# Patient Record
Sex: MALE | Race: WHITE | NOT HISPANIC OR LATINO
[De-identification: names, ages, dates, MRNs, and addresses within clinical notes are randomized per-mention and may not be internally consistent; named-entity substitution may affect disease eponyms.]

---
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
## 2018-03-07 ENCOUNTER — APPOINTMENT (OUTPATIENT)
Dept: PLASTIC SURGERY | Facility: CLINIC | Age: 18
End: 2018-03-07

## 2019-04-15 ENCOUNTER — EMERGENCY (EMERGENCY)
Facility: HOSPITAL | Age: 19
LOS: 0 days | Discharge: HOME | End: 2019-04-15
Attending: EMERGENCY MEDICINE | Admitting: EMERGENCY MEDICINE
Payer: MEDICAID

## 2019-04-15 VITALS
SYSTOLIC BLOOD PRESSURE: 141 MMHG | TEMPERATURE: 100 F | HEART RATE: 96 BPM | DIASTOLIC BLOOD PRESSURE: 68 MMHG | RESPIRATION RATE: 18 BRPM | OXYGEN SATURATION: 99 %

## 2019-04-15 DIAGNOSIS — J02.9 ACUTE PHARYNGITIS, UNSPECIFIED: ICD-10-CM

## 2019-04-15 DIAGNOSIS — Z79.2 LONG TERM (CURRENT) USE OF ANTIBIOTICS: ICD-10-CM

## 2019-04-15 PROCEDURE — 99283 EMERGENCY DEPT VISIT LOW MDM: CPT

## 2019-04-15 RX ORDER — IBUPROFEN 200 MG
800 TABLET ORAL ONCE
Qty: 0 | Refills: 0 | Status: COMPLETED | OUTPATIENT
Start: 2019-04-15 | End: 2019-04-15

## 2019-04-15 RX ORDER — DEXAMETHASONE 0.5 MG/5ML
10 ELIXIR ORAL ONCE
Qty: 0 | Refills: 0 | Status: COMPLETED | OUTPATIENT
Start: 2019-04-15 | End: 2019-04-15

## 2019-04-15 RX ADMIN — Medication 800 MILLIGRAM(S): at 12:23

## 2019-04-15 RX ADMIN — Medication 1 TABLET(S): at 12:23

## 2019-04-15 RX ADMIN — Medication 10 MILLIGRAM(S): at 12:33

## 2019-04-15 NOTE — ED PROVIDER NOTE - PHYSICAL EXAMINATION
CONSTITUTIONAL: Well-developed; well-nourished; in no acute distress. Tolerating secretions.   SKIN: warm, dry  HEAD: Normocephalic; atraumatic.  EYES: no conjunctival injection. PERRL.   ENT: No nasal discharge; airway clear. + b/l tonsillar erythema, edema with exudate.   NECK: Supple; non tender.  CARD: S1, S2 normal; no murmurs, gallops, or rubs. Regular rate and rhythm.   RESP: No wheezes, rales or rhonchi.  ABD: soft ntnd  EXT: Normal ROM.  No clubbing, cyanosis or edema.   LYMPH: No acute cervical adenopathy.  NEURO: Alert, oriented, grossly unremarkable  PSYCH: Cooperative, appropriate.

## 2019-04-15 NOTE — ED PROVIDER NOTE - OBJECTIVE STATEMENT
18 y/o male with no PMH who presents to ED for 2 days of tactile fever, chills, sore throat. No cough, ill contacts. No history of STD or oral sex. No difficulty swallowing or opening the mouth.

## 2019-04-15 NOTE — ED ADULT NURSE NOTE - OBJECTIVE STATEMENT
pt presents with sore throat and mid back pain x 2 days. Infected epiglottis noted, pt reports chills at home, stating didn't check temp at home.

## 2019-04-15 NOTE — ED PROVIDER NOTE - CLINICAL SUMMARY MEDICAL DECISION MAKING FREE TEXT BOX
18 yo male with exudative pharyngitis,  no drooling or trismus , no clinical signs of PTA.  Decadron given , abx , follow up with PMD< strict return precautions given.,

## 2019-04-15 NOTE — ED PROVIDER NOTE - NSFOLLOWUPCLINICS_GEN_ALL_ED_FT
[facility]  ENT  [address], [address]  [place] [address]  Phone: [phone]  Fax:   Follow Up Time:

## 2019-04-15 NOTE — ED PROVIDER NOTE - ATTENDING CONTRIBUTION TO CARE
20 yo male with exudative pharyngitis, no PTA , supple neck, no trismus or drooling, nml phonation and work of breathing.  Plan: steroids, abx, follow up with PMD , return to ER if any worsening symptoms,  patient and mom are amenable with the plan.

## 2020-08-03 ENCOUNTER — TRANSCRIPTION ENCOUNTER (OUTPATIENT)
Age: 20
End: 2020-08-03

## 2021-02-23 ENCOUNTER — TRANSCRIPTION ENCOUNTER (OUTPATIENT)
Age: 21
End: 2021-02-23

## 2023-10-11 NOTE — ED ADULT NURSE NOTE - IN THE PAST YEAR, HOW OFTEN HAVE YOU USED TOBACCO PRODUCTS?
CC:  Pollo Mays is here today for Office Visit, Other (Follow up depression as he stopped taking his medication), and Wrist Pain (Right wrist pain started 10/10/2023)   . Medications: medications verified and updated  Refills needed today? YES  Patient would like communication of their results via:   Karly Daughters  Advanced directives: No          Health Maintenance Due   Topic Date Due   â¢ Hepatitis B Vaccine (1 of 3 - 3-dose series) Never done   â¢ DTaP/Tdap/Td Vaccine (1 - Tdap) Never done   â¢ Depression Screening  10/14/2023     Patient is due for topics listed above, he wishes to proceed with Depression Screening , but is not proceeding with the rest at this time. The following has occurred: to speak to provider. Review Flowsheet  More data exists       10/11/2023   PHQ 2/9 Score   Adult PHQ 2 Score 4   Adult PHQ 2 Interpretation Further screening needed   Little interest or pleasure in activity? Nearly every day   Feeling down, depressed or hopeless? Several days   Adult PHQ 9 Score 21   Adult PHQ 9 Interpretation Severe Depression   Trouble falling or staying asleep or sleeping all the time? Nearly every day   Feeling tired or having little energy? Nearly every day   Poor appetite or overeating? More than half the days   Feeling bad about yourself or that you are a failure or have let yourself or family down? Nearly every day   Trouble concentrating on things such as reading the newspaper or watching TV? More than half the days   Moving or speaking slowly that other people have noticed or the opposite - being so fidgety or restless that you have been moving around a lot more than usual? More than half the days   Thoughts that you would be better off dead or of hurting yourself in some way? More than half the days   If you reported any problems, how difficult have these problems made it to do your work, take care of things at home, or get along with other people?  Very difficult       DEPRESSION ASSESSMENT/PLAN:  Depression Screening performed. Screening time with patient was 3 minutes. Never

## 2025-05-02 ENCOUNTER — EMERGENCY (EMERGENCY)
Facility: HOSPITAL | Age: 25
LOS: 0 days | Discharge: HOME | End: 2025-05-02
Attending: EMERGENCY MEDICINE
Payer: SELF-PAY

## 2025-05-02 VITALS
DIASTOLIC BLOOD PRESSURE: 69 MMHG | TEMPERATURE: 99 F | OXYGEN SATURATION: 99 % | WEIGHT: 175.05 LBS | HEART RATE: 75 BPM | SYSTOLIC BLOOD PRESSURE: 120 MMHG | RESPIRATION RATE: 20 BRPM
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
## 2025-05-02 DIAGNOSIS — S10.86XA INSECT BITE OF OTHER SPECIFIED PART OF NECK, INITIAL ENCOUNTER: ICD-10-CM

## 2025-05-02 PROBLEM — Z78.9 OTHER SPECIFIED HEALTH STATUS: Chronic | Status: ACTIVE | Noted: 2019-04-15

## 2025-05-02 PROCEDURE — 99282 EMERGENCY DEPT VISIT SF MDM: CPT

## 2025-05-02 PROCEDURE — 99053 MED SERV 10PM-8AM 24 HR FAC: CPT

## 2025-05-02 PROCEDURE — 99283 EMERGENCY DEPT VISIT LOW MDM: CPT

## 2025-05-02 NOTE — ED PROVIDER NOTE - OBJECTIVE STATEMENT
Patient is a 25-year-old male presents for evaluation of potential sting patient was stung earlier in the day on his neck presents here for evaluation states that he considers a wasp or yellowjacket sting otherwise asymptomatic pain is improved no fevers no chills no reaction

## 2025-05-02 NOTE — ED PROVIDER NOTE - NSFOLLOWUPINSTRUCTIONS_ED_ALL_ED_FT
Please follow up with your primary care doctor.    Bee, Wasp, or Hornet Sting, Adult  Bees, wasps, and hornets are part of a family of insects that sting. Normally, a sting will cause pain, redness, and swelling at the sting site. However, some people have an allergy to these stings, and their reactions can be much more serious.    What increases the risk?  You may be at a greater risk of getting stung if you:  Provoke a stinging insect by swatting or disturbing it.  Wear strong-smelling soaps, deodorants, or body sprays.  Spend time outdoors near gardens with flowers or fruit trees or in clothes that expose skin.  Eat or drink outside.  What are the signs or symptoms?  The reaction to an insect sting can vary from a mild, normal response to life-threatening anaphylaxis. The sting site is often a red lump in the skin, sometimes with a tiny hole in the center, that may still have the stinger in the center of the wound.    Normal reaction    A normal reaction is experienced by most people after an insect sting. Symptoms include:  Pain, redness, and swelling at the sting site. These can develop over 24–48 hours.  Pain, redness, and swelling that may spread to a larger, connected area beyond the sting site. The spreading can continue over 24–48 hours.  Allergic reaction    An allergic reaction can vary in severity and includes symptoms in other areas of the body beyond the sting site. People who experience an allergic reaction have a higher risk of having similar or worse symptoms the next time they are stung. Symptoms may include:  Hives, itching, and swelling.  Abdominal symptoms including cramping, nausea, vomiting, and diarrhea.  Severe symptoms that require immediate medical attention include:  Chest pain or tightness.  Wheezing or trouble breathing.  Swelling of the tongue, throat, or lips.  Trouble swallowing or hoarse voice.  Anaphylactic reaction    An anaphylactic reaction is a severe, life-threatening allergy and requires immediate medical attention. The symptoms often include severe allergic reaction symptoms that develop rapidly and lead to:  A sudden and sharp drop in blood pressure.  Dizziness.  Loss of consciousness.  How is this diagnosed?  This condition is usually diagnosed based on your symptoms and medical history as well as a physical exam. You may have an allergy test to determine if you are allergic to the insect venom.    How is this treated?  If you were stung by a bee, the stinger and a small sac of venom may be in the wound. Remove the stinger as soon as possible. Do this by brushing across the wound with gauze, a clean fingernail, or a flat card such as a credit card. This can help reduce the severity of your body's reaction to the sting.    Normal reactions can be treated with:  Washing the area thoroughly with soap and water.  Applying ice to the area to reduce swelling.  Oral or topical medicines to help reduce pain and itching, if present.  Pay close attention to your symptoms after you have been stung. If possible, have someone stay with you to see if an allergic reaction develops. If allergic symptoms develop, oral antihistamines can be taken and you will need medical help right away.    If you had an allergic reaction before, you may need to:  Use an auto-injector "pen"(pre-filled automatic epinephrine injection device)at the first sign of an allergic reaction.  Get medical help right away because the epinephrine is short-acting. It is intended to give you more time to get to an emergency room.  Follow these instructions at home:  Bag of ice on a towel on the skin.   Wash the sting site 2–3 times a day with soap and water as told by your health care provider.  Apply or take over-the-counter and prescription medicines only as told by your health care provider.  If directed, apply ice to the sting area.  Put ice in a plastic bag.  Place a towel between your skin and the bag.  Leave the ice on for 20 minutes, 2–3 times a day.  Do not scratch the sting area.  If you had a severe allergic reaction to a sting, you may need to:  Wear a medical bracelet or necklace that lists the allergy.  Carry an anaphylaxis kit or an epinephrine auto-injector "pen" with you at all times. Tell your family members, friends, and coworkers when and how to use it. Use it at the first sign of an allergic reaction.  How is this prevented?  Avoid swatting at stinging insects and disturbing insect nests.  Do not use fragrant soaps or lotions and avoid sitting near flowering plants, if possible.  Wear shoes, pants, and long sleeves when spending time outdoors, especially in grassy areas where stinging insects are common.  Keep outdoor areas free from nests or hives.  Keep food and drink containers covered when eating outdoors.  Wear gloves if you are gardening or working outdoors.  Find a barrier between you and the insect(s), such as a door, if an attack by a stinging insect or a swarm seems likely.  Contact a health care provider if:  Your symptoms do not get better in 2–3 days.  You have redness, swelling, or pain that spreads beyond the area of the sting.  You have a fever.  Get help right away if:  You have symptoms of a severe allergic reaction. These include:  Chest tightness or pain.  Wheezing, or trouble swallowing or breathing.  Light-headedness, dizziness, or fainting.  Itchy, raised, red patches on the skin beyond the sting site.  Abdominal cramping, nausea, vomiting, or diarrhea.  Trouble swallowing or a swollen tongue, throat, or lips.  These symptoms may be an emergency. Get help right away. Call 911.  Do not wait to see if the symptoms will go away.  Do not drive yourself to the hospital.  Summary  Stings from bees, wasps, and hornets can cause pain and swelling, but they are usually not serious. However, some people may have an allergic reaction to a sting. This can cause the symptoms to be more severe.  Pay close attention to your symptoms after you have been stung. If possible, have someone stay with you to look for signs of worsening symptoms.  Call your health care provider if you have any signs of an allergic reaction.  This information is not intended to replace advice given to you by your health care provider. Make sure you discuss any questions you have with your health care provider.

## 2025-05-02 NOTE — ED PROVIDER NOTE - PHYSICAL EXAMINATION
On physical exam patient does have a small sting frank located mid trapezius no signs of erythema no signs of cellulitis no signs of abscess no signs of allergic reaction stinger appears completely removed I will discharge follow-up to PMD next 24 to 48 hours or return to the emergency department for any further concerns no signs of infection at this time

## 2025-05-02 NOTE — ED PROVIDER NOTE - CCCP TRG CHIEF CMPLNT
In an effort to ensure that our patients [employer], a Team Member has reviewed your chart and identified an opportunity to provide the best care possible. An attempt was made to discuss or schedule overdue Preventive or Disease Management screening.     The Outcome was Contact was not made, letter/portal message sent.  If you have any questions or need help with scheduling, contact your primary care provider.. Care Gaps include Colorectal Cancer Screening.    stings, insect

## 2025-05-02 NOTE — ED PROVIDER NOTE - ATTENDING CONTRIBUTION TO CARE
I have personally performed a history and physical exam on this patient and personally directed the management of the patient. Patient is a 25-year-old male presents for evaluation of potential sting patient was stung earlier in the day on his neck presents here for evaluation states that he considers a wasp or yellowjacket sting otherwise asymptomatic pain is improved no fevers no chills no reaction    On physical exam patient does have a small sting frank located mid trapezius no signs of erythema no signs of cellulitis no signs of abscess no signs of allergic reaction stinger appears completely removed I will discharge follow-up to PMD next 24 to 48 hours or return to the emergency department for any further concerns no signs of infection at this time

## 2025-05-02 NOTE — ED PROVIDER NOTE - CLINICAL SUMMARY MEDICAL DECISION MAKING FREE TEXT BOX
Patient is a 25-year-old male presents for evaluation of potential sting patient was stung earlier in the day on his neck presents here for evaluation states that he considers a wasp or yellowjacket sting otherwise asymptomatic pain is improved no fevers no chills no reaction    On physical exam patient does have a small sting frank located mid trapezius no signs of erythema no signs of cellulitis no signs of abscess no signs of allergic reaction stinger appears completely removed I will discharge follow-up to PMD next 24 to 48 hours or return to the emergency department for any further concerns no signs of infection at this time

## 2025-05-02 NOTE — ED PROVIDER NOTE - PATIENT PORTAL LINK FT
You can access the FollowMyHealth Patient Portal offered by Health system by registering at the following website: [email or web address]. By joining [employer]’s FollowMyHealth portal, you will also be able to view your health information using other applications (apps) compatible with our system.

## 2025-06-18 ENCOUNTER — RESULT REVIEW (OUTPATIENT)
Age: 25
End: 2025-06-18

## 2025-06-18 ENCOUNTER — APPOINTMENT (OUTPATIENT)
Dept: PULMONOLOGY | Facility: CLINIC | Age: 25
End: 2025-06-18
Payer: MEDICAID

## 2025-06-18 ENCOUNTER — LABORATORY RESULT (OUTPATIENT)
Age: 25
End: 2025-06-18

## 2025-06-18 ENCOUNTER — OUTPATIENT (OUTPATIENT)
Dept: OUTPATIENT SERVICES | Facility: HOSPITAL | Age: 25
LOS: 1 days | End: 2025-06-18
Payer: MEDICAID

## 2025-06-18 VITALS
HEART RATE: 83 BPM | OXYGEN SATURATION: 97 % | WEIGHT: 193 LBS | SYSTOLIC BLOOD PRESSURE: 130 MMHG | DIASTOLIC BLOOD PRESSURE: 62 MMHG

## 2025-06-18 DIAGNOSIS — R06.02 SHORTNESS OF BREATH: ICD-10-CM

## 2025-06-18 PROBLEM — F17.290 NICOTINE DEPENDENCE DUE TO VAPING TOBACCO PRODUCT: Status: ACTIVE | Noted: 2025-06-18

## 2025-06-18 PROCEDURE — 71046 X-RAY EXAM CHEST 2 VIEWS: CPT | Mod: 26

## 2025-06-18 PROCEDURE — 71046 X-RAY EXAM CHEST 2 VIEWS: CPT

## 2025-06-18 PROCEDURE — G2211 COMPLEX E/M VISIT ADD ON: CPT | Mod: NC

## 2025-06-18 PROCEDURE — 99204 OFFICE O/P NEW MOD 45 MIN: CPT

## 2025-06-18 RX ORDER — ALBUTEROL SULFATE 90 UG/1
108 (90 BASE) INHALANT RESPIRATORY (INHALATION)
Qty: 1 | Refills: 1 | Status: ACTIVE | COMMUNITY
Start: 2025-06-18 | End: 1900-01-01

## 2025-06-18 RX ORDER — BUPRENORPHINE HCL/NALOXONE HCL 8 MG-2 MG
TABLET, SUBLINGUAL SUBLINGUAL
Refills: 0 | Status: ACTIVE | COMMUNITY

## 2025-06-19 DIAGNOSIS — R06.02 SHORTNESS OF BREATH: ICD-10-CM

## 2025-06-20 LAB
A ALTERNATA IGE QN: <0.1 KUA/L
A FUMIGATUS IGE QN: <0.1 KUA/L
BASOPHILS # BLD AUTO: 0.03 K/UL
BASOPHILS NFR BLD AUTO: 0.8 %
C ALBICANS IGE QN: <0.1 KUA/L
C HERBARUM IGE QN: <0.1 KUA/L
CAT DANDER IGE QN: <0.1 KUA/L
COMMON RAGWEED IGE QN: <0.1 KUA/L
D FARINAE IGE QN: 0.21 KUA/L
D PTERONYSS IGE QN: 0.27 KUA/L
DEPRECATED A ALTERNATA IGE RAST QL: 0
DEPRECATED A FUMIGATUS IGE RAST QL: 0
DEPRECATED C ALBICANS IGE RAST QL: 0
DEPRECATED C HERBARUM IGE RAST QL: 0
DEPRECATED CAT DANDER IGE RAST QL: 0
DEPRECATED COMMON RAGWEED IGE RAST QL: 0
DEPRECATED D FARINAE IGE RAST QL: NORMAL
DEPRECATED D PTERONYSS IGE RAST QL: NORMAL
DEPRECATED DOG DANDER IGE RAST QL: 0
DEPRECATED M RACEMOSUS IGE RAST QL: 0
DEPRECATED ROACH IGE RAST QL: 0
DEPRECATED TIMOTHY IGE RAST QL: 0
DEPRECATED WHITE OAK IGE RAST QL: 0
DOG DANDER IGE QN: <0.1 KUA/L
EOSINOPHIL # BLD AUTO: 0.03 K/UL
EOSINOPHIL NFR BLD AUTO: 0.8 %
HCT VFR BLD CALC: 46 %
HGB BLD-MCNC: 15 G/DL
IMM GRANULOCYTES NFR BLD AUTO: 0 %
LYMPHOCYTES # BLD AUTO: 1.22 K/UL
LYMPHOCYTES NFR BLD AUTO: 31.1 %
M RACEMOSUS IGE QN: <0.1 KUA/L
MAN DIFF?: NORMAL
MCHC RBC-ENTMCNC: 28.3 PG
MCHC RBC-ENTMCNC: 32.6 G/DL
MCV RBC AUTO: 86.8 FL
MONOCYTES # BLD AUTO: 0.41 K/UL
MONOCYTES NFR BLD AUTO: 10.5 %
NEUTROPHILS # BLD AUTO: 2.23 K/UL
NEUTROPHILS NFR BLD AUTO: 56.8 %
PLATELET # BLD AUTO: 249 K/UL
PMV BLD AUTO: 0 /100 WBCS
PMV BLD: 11 FL
RBC # BLD: 5.3 M/UL
RBC # FLD: 11.9 %
ROACH IGE QN: <0.1 KUA/L
TIMOTHY IGE QN: <0.1 KUA/L
TOTAL IGE SMQN RAST: 57 KU/L
WBC # FLD AUTO: 3.92 K/UL
WHITE OAK IGE QN: <0.1 KUA/L

## 2025-07-21 ENCOUNTER — APPOINTMENT (OUTPATIENT)
Dept: PULMONOLOGY | Facility: CLINIC | Age: 25
End: 2025-07-21
Payer: MEDICAID

## 2025-07-21 VITALS
HEART RATE: 77 BPM | WEIGHT: 197 LBS | SYSTOLIC BLOOD PRESSURE: 112 MMHG | OXYGEN SATURATION: 98 % | DIASTOLIC BLOOD PRESSURE: 62 MMHG

## 2025-07-21 DIAGNOSIS — J45.20 MILD INTERMITTENT ASTHMA, UNCOMPLICATED: ICD-10-CM

## 2025-07-21 PROCEDURE — G2211 COMPLEX E/M VISIT ADD ON: CPT | Mod: NC

## 2025-07-21 PROCEDURE — 99213 OFFICE O/P EST LOW 20 MIN: CPT
